# Patient Record
Sex: FEMALE | Race: BLACK OR AFRICAN AMERICAN | NOT HISPANIC OR LATINO | URBAN - METROPOLITAN AREA
[De-identification: names, ages, dates, MRNs, and addresses within clinical notes are randomized per-mention and may not be internally consistent; named-entity substitution may affect disease eponyms.]

---

## 2022-07-19 ENCOUNTER — WEB ENCOUNTER (OUTPATIENT)
Dept: URBAN - METROPOLITAN AREA CLINIC 128 | Facility: CLINIC | Age: 47
End: 2022-07-19

## 2022-07-25 ENCOUNTER — LAB OUTSIDE AN ENCOUNTER (OUTPATIENT)
Dept: URBAN - METROPOLITAN AREA CLINIC 128 | Facility: CLINIC | Age: 47
End: 2022-07-25

## 2022-07-25 ENCOUNTER — OFFICE VISIT (OUTPATIENT)
Dept: URBAN - METROPOLITAN AREA CLINIC 128 | Facility: CLINIC | Age: 47
End: 2022-07-25
Payer: COMMERCIAL

## 2022-07-25 VITALS
DIASTOLIC BLOOD PRESSURE: 88 MMHG | HEART RATE: 87 BPM | SYSTOLIC BLOOD PRESSURE: 134 MMHG | TEMPERATURE: 97.9 F | HEIGHT: 66 IN | BODY MASS INDEX: 27.64 KG/M2 | WEIGHT: 172 LBS

## 2022-07-25 DIAGNOSIS — K76.9 LIVER LESION: ICD-10-CM

## 2022-07-25 DIAGNOSIS — R16.0 HEPATOMEGALY: ICD-10-CM

## 2022-07-25 DIAGNOSIS — R10.11 RUQ ABDOMINAL PAIN: ICD-10-CM

## 2022-07-25 DIAGNOSIS — B27.90 EBV INFECTION: ICD-10-CM

## 2022-07-25 PROCEDURE — 99204 OFFICE O/P NEW MOD 45 MIN: CPT | Performed by: STUDENT IN AN ORGANIZED HEALTH CARE EDUCATION/TRAINING PROGRAM

## 2022-07-25 NOTE — HPI-TODAY'S VISIT:
The pt is a 46 yo F who presents for concerns of hepatomegaly and RUQ pain, colicky, and originally thought to be GB issue.  Workup by her PCP Dr. Melo, which was extensive, included labs, imaging.  Labs showed normal LFTs, T. bili of 0.8, AST/ALT of 12/13, CT showing colon wall thickening with left sided colitis - infection vs. inflammation, hepatomegaly of 20 cm with 2 small liver lesions noted and follow up MRI recommended.  MRI liver protocol redomonstrated hepatomegaly and the lesions were isoenhancing, leading to differentials of FNH vs. hepatic adenoma as  likely with the 2 lesions 8 mm and 4 mm in size.  A 6 month repeat MRI was recommended.  In the interim, a HIDA scan showed 85% EF.  Of note, she is on Apri - an OCP with estrogen in it, which could worsen the liver lesion should it be a HA and not a FNH.  Pt to dicuss with Ob-gyn to change birth control to non-estorgen formulation.  As part of the workup Infectious Mononucleosis titers were obtained and her EBV Ab IGG was high at 515 and viral capsid was high at 271.  She also had covid infection last year but a mild case.  As the pt was also manifesting myalgias and arthralgias, rheumatologic labs were ordered and the pt was seen by a rhematologist.  Has seen a rheumatologist for CK+ and there is concerns for possible scleroderma but no symptoms.  Was placed on prednisone.  Currently she reports her RUQ pain has gotten better.  She also reports no lower GI symptoms.  Has had a normal colonoscopy with Dr. Alex Raymond in Oct 2019 with a recommended 10 year follow up.  Has stopped taking exedrin and not taking any NSAIDs and has noted improvement in her symptoms.  Not taking tylenol either.  Takes meloxicam for inflammation in breasts - dense fibrous breasts, for which she is scheduled for a biopsy.  Taking evening primrose for her myalgias which has given her pain relief in addition to the steroids..    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

## 2022-08-31 ENCOUNTER — OFFICE VISIT (OUTPATIENT)
Dept: URBAN - METROPOLITAN AREA CLINIC 18 | Facility: CLINIC | Age: 47
End: 2022-08-31
Payer: COMMERCIAL

## 2022-08-31 DIAGNOSIS — R16.0 HEPATOMEGALY: ICD-10-CM

## 2022-08-31 PROCEDURE — 76705 ECHO EXAM OF ABDOMEN: CPT | Performed by: STUDENT IN AN ORGANIZED HEALTH CARE EDUCATION/TRAINING PROGRAM

## 2022-09-15 ENCOUNTER — LAB OUTSIDE AN ENCOUNTER (OUTPATIENT)
Dept: URBAN - METROPOLITAN AREA CLINIC 19 | Facility: CLINIC | Age: 47
End: 2022-09-15

## 2022-09-15 ENCOUNTER — TELEPHONE ENCOUNTER (OUTPATIENT)
Dept: URBAN - METROPOLITAN AREA CLINIC 19 | Facility: CLINIC | Age: 47
End: 2022-09-15

## 2022-09-15 ENCOUNTER — OFFICE VISIT (OUTPATIENT)
Dept: URBAN - METROPOLITAN AREA CLINIC 19 | Facility: CLINIC | Age: 47
End: 2022-09-15
Payer: COMMERCIAL

## 2022-09-15 VITALS
HEART RATE: 93 BPM | HEIGHT: 66 IN | BODY MASS INDEX: 27 KG/M2 | TEMPERATURE: 99.1 F | WEIGHT: 168 LBS | SYSTOLIC BLOOD PRESSURE: 118 MMHG | DIASTOLIC BLOOD PRESSURE: 68 MMHG

## 2022-09-15 DIAGNOSIS — B27.90 EBV INFECTION: ICD-10-CM

## 2022-09-15 DIAGNOSIS — B17.9 ACUTE VIRAL HEPATITIS, UNSPECIFIED VIRAL HEPATITIS TYPE: ICD-10-CM

## 2022-09-15 DIAGNOSIS — R16.0 HEPATOMEGALY: ICD-10-CM

## 2022-09-15 DIAGNOSIS — R10.11 RUQ ABDOMINAL PAIN: ICD-10-CM

## 2022-09-15 PROBLEM — 57412004: Status: ACTIVE | Noted: 2022-09-15

## 2022-09-15 PROBLEM — 301717006: Status: ACTIVE | Noted: 2022-07-25

## 2022-09-15 PROCEDURE — 99213 OFFICE O/P EST LOW 20 MIN: CPT | Performed by: STUDENT IN AN ORGANIZED HEALTH CARE EDUCATION/TRAINING PROGRAM

## 2022-09-15 NOTE — HPI-TODAY'S VISIT:
The pt is a 48 yo F who presents for follow up.  Was seen in July as a referral from her PCP for hepatomegaly in the setting of elevated liver tests.  She underwent extensive workup that showed elevated EBV titers but was otherwise negative for the etiology for her presentation.  The pt was also on evening primrose oil supplement at that time as well as Apri - OCP.   She was managed conservatively at that time and asked to repeat labs in Sept prior to follow up.  She was also recommended a repeat us.  She had some concerns of abdominal pain during last clinic visit.  If these had not resolved, the plan was to repeat the colonoscopy.  Her last c-scope was in 2019 with dr. Alex Raymond and she had been recommended a 10-yr follow up.  Today she has occasional twinges superficially in the abdomen but very rare.  Had a normal ultrasound of the liver.   Has switched her Apri to a progesterone only OCP - encasia

## 2022-09-19 ENCOUNTER — OFFICE VISIT (OUTPATIENT)
Dept: URBAN - METROPOLITAN AREA CLINIC 128 | Facility: CLINIC | Age: 47
End: 2022-09-19

## 2022-09-19 PROBLEM — 80515008: Status: ACTIVE | Noted: 2022-07-25

## 2022-09-21 LAB
A/G RATIO: 2.2
ALBUMIN: 4.9
ALKALINE PHOSPHATASE: 87
ALT (SGPT): 39
AST (SGOT): 24
BILIRUBIN, TOTAL: 0.8
BUN/CREATININE RATIO: (no result)
BUN: 15
CALCIUM: 10.2
CARBON DIOXIDE, TOTAL: 24
CHLORIDE: 104
CREATININE: 0.64
EGFR: 108
GLOBULIN, TOTAL: 2.2
GLUCOSE: 83
HDV IGM: NEGATIVE
INR: 0.9
POTASSIUM: 4
PROTEIN, TOTAL: 7.1
PT: 9.8
SODIUM: 139

## 2022-09-22 ENCOUNTER — TELEPHONE ENCOUNTER (OUTPATIENT)
Dept: URBAN - METROPOLITAN AREA CLINIC 19 | Facility: CLINIC | Age: 47
End: 2022-09-22

## 2022-09-22 ENCOUNTER — LAB OUTSIDE AN ENCOUNTER (OUTPATIENT)
Dept: URBAN - METROPOLITAN AREA CLINIC 19 | Facility: CLINIC | Age: 47
End: 2022-09-22

## 2022-09-22 PROBLEM — 240530001: Status: ACTIVE | Noted: 2022-07-25

## 2022-09-22 PROBLEM — 300331000: Status: ACTIVE | Noted: 2022-07-25

## 2024-03-19 ENCOUNTER — OV EP (OUTPATIENT)
Dept: URBAN - METROPOLITAN AREA CLINIC 128 | Facility: CLINIC | Age: 49
End: 2024-03-19
Payer: COMMERCIAL

## 2024-03-19 ENCOUNTER — LAB (OUTPATIENT)
Dept: URBAN - METROPOLITAN AREA CLINIC 128 | Facility: CLINIC | Age: 49
End: 2024-03-19

## 2024-03-19 VITALS
OXYGEN SATURATION: 99 % | SYSTOLIC BLOOD PRESSURE: 140 MMHG | WEIGHT: 180 LBS | HEIGHT: 66 IN | TEMPERATURE: 97.2 F | HEART RATE: 118 BPM | BODY MASS INDEX: 28.93 KG/M2 | DIASTOLIC BLOOD PRESSURE: 90 MMHG

## 2024-03-19 DIAGNOSIS — R16.0 HEPATOMEGALY: ICD-10-CM

## 2024-03-19 DIAGNOSIS — B27.90 EBV INFECTION: ICD-10-CM

## 2024-03-19 DIAGNOSIS — R10.11 RUQ ABDOMINAL PAIN: ICD-10-CM

## 2024-03-19 DIAGNOSIS — B17.9 ACUTE VIRAL HEPATITIS, UNSPECIFIED VIRAL HEPATITIS TYPE: ICD-10-CM

## 2024-03-19 PROCEDURE — 99214 OFFICE O/P EST MOD 30 MIN: CPT | Performed by: STUDENT IN AN ORGANIZED HEALTH CARE EDUCATION/TRAINING PROGRAM

## 2024-03-19 NOTE — HPI-TODAY'S VISIT:
3/19/2024: Radha: The patient is a 48-year-old F with concerns for hepatomegaly and abnormal LFTs in the setting of EBV infection.  Ultrasound at follow-up showed normalization of enlarged liver with no evidence of hepatitis.  LFTs at follow-up normal.  UTD with her screening colonoscopy and due again in 2029 for colon screening. She presents today with the following concerns:  Mirgrating abdominal pain shifting from left to right side.  More "inflamed feeling".  Intermittent pain.  No timing or related to eating.  Has bouts of loose stools or fecal urgency.  Also has joint and bones hurting.    FH:  No FH of IBD.  Father had diabetes and maybe gastroparesis.  ============================  9/15/2022: Radha: The pt is a 48 yo F who presents for follow up.  Was seen in July as a referral from her PCP for hepatomegaly in the setting of elevated liver tests.  She underwent extensive workup that showed elevated EBV titers but was otherwise negative for the etiology for her presentation.  The pt was also on evening primrose oil supplement at that time as well as Apri - OCP.   She was managed conservatively at that time and asked to repeat labs in Sept prior to follow up.  She was also recommended a repeat us.  She had some concerns of abdominal pain during last clinic visit.  If these had not resolved, the plan was to repeat the colonoscopy.  Her last c-scope was in 2019 with dr. Alex Raymond and she had been recommended a 10-yr follow up.  Today she has occasional twinges superficially in the abdomen but very rare.  Had a normal ultrasound of the liver.   Has switched her Apri to a progesterone only OCP - encasia Plan: Repeat LFTs.  1 year follow-up.

## 2024-03-25 LAB
ABSOLUTE BASOPHILS: 23
ABSOLUTE EOSINOPHILS: 92
ABSOLUTE LYMPHOCYTES: 2256
ABSOLUTE MONOCYTES: 608
ABSOLUTE NEUTROPHILS: 4720
ALBUMIN/GLOBULIN RATIO: 1.8
ALBUMIN: 4.8
ALKALINE PHOSPHATASE: 76
ALT: 26
AST: 19
BASOPHILS: 0.3
BILIRUBIN, TOTAL: 0.6
BUN/CREATININE RATIO: (no result)
CALCIUM: 9.5
CARBON DIOXIDE: 26
CHLORIDE: 101
CREATININE: 0.65
EGFR: 107
EOSINOPHILS: 1.2
FIB 4 INDEX: 0.56
FIB 4 INTERPRETATION: (no result)
GLOBULIN: 2.6
GLUCOSE: 98
HEMATOCRIT: 36.1
HEMOGLOBIN: 12.3
IMMUNOGLOBULIN A: 199
INR: 1
INTERPRETATION: (no result)
LYMPHOCYTES: 29.3
MCH: 33.1
MCHC: 34.1
MCV: 97
MONOCYTES: 7.9
MPV: 9.9
NEUTROPHILS: 61.3
PLATELET COUNT: 330
PLATELET COUNT: 330
POTASSIUM: 3.7
PROTEIN, TOTAL: 7.4
PT: 10.3
RDW: 11.3
RED BLOOD CELL COUNT: 3.72
SODIUM: 137
TISSUE TRANSGLUTAMINASE AB, IGA: <1
UREA NITROGEN (BUN): 14
WHITE BLOOD CELL COUNT: 7.7

## 2024-07-02 ENCOUNTER — OFFICE VISIT (OUTPATIENT)
Dept: URBAN - METROPOLITAN AREA CLINIC 128 | Facility: CLINIC | Age: 49
End: 2024-07-02

## 2024-08-13 ENCOUNTER — OFFICE VISIT (OUTPATIENT)
Dept: URBAN - METROPOLITAN AREA CLINIC 128 | Facility: CLINIC | Age: 49
End: 2024-08-13

## 2024-09-03 ENCOUNTER — OFFICE VISIT (OUTPATIENT)
Dept: URBAN - METROPOLITAN AREA CLINIC 128 | Facility: CLINIC | Age: 49
End: 2024-09-03

## 2024-10-15 ENCOUNTER — DASHBOARD ENCOUNTERS (OUTPATIENT)
Age: 49
End: 2024-10-15

## 2024-10-15 ENCOUNTER — OFFICE VISIT (OUTPATIENT)
Dept: URBAN - METROPOLITAN AREA CLINIC 128 | Facility: CLINIC | Age: 49
End: 2024-10-15
Payer: COMMERCIAL

## 2024-10-15 VITALS
HEIGHT: 66 IN | SYSTOLIC BLOOD PRESSURE: 140 MMHG | WEIGHT: 184.2 LBS | TEMPERATURE: 97.5 F | OXYGEN SATURATION: 98 % | BODY MASS INDEX: 29.6 KG/M2 | HEART RATE: 100 BPM | DIASTOLIC BLOOD PRESSURE: 84 MMHG

## 2024-10-15 DIAGNOSIS — B17.9 ACUTE VIRAL HEPATITIS, UNSPECIFIED VIRAL HEPATITIS TYPE: ICD-10-CM

## 2024-10-15 DIAGNOSIS — R10.11 RUQ ABDOMINAL PAIN: ICD-10-CM

## 2024-10-15 DIAGNOSIS — K64.8 INTERNAL HEMORRHOIDS: ICD-10-CM

## 2024-10-15 DIAGNOSIS — B27.90 EBV INFECTION: ICD-10-CM

## 2024-10-15 DIAGNOSIS — R16.0 HEPATOMEGALY: ICD-10-CM

## 2024-10-15 PROBLEM — 90458007: Status: ACTIVE | Noted: 2024-10-15

## 2024-10-15 PROCEDURE — 99213 OFFICE O/P EST LOW 20 MIN: CPT | Performed by: STUDENT IN AN ORGANIZED HEALTH CARE EDUCATION/TRAINING PROGRAM

## 2024-10-15 RX ORDER — HYDROCORTISONE 25 MG/G
1 APPLICATION CREAM TOPICAL THREE TIMES A DAY
Qty: 60 GRAM | Refills: 1 | OUTPATIENT
Start: 2024-10-15 | End: 2024-10-25

## 2024-10-15 NOTE — HPI-TODAY'S VISIT:
10/15/2024:  Radha: 48 yo F seen in follow up.  Previously seen for hepatomegaly and liver lesion on MRI in June 2022 not seen on US in March 2024.  Migratory abdominal pain with bouts of fecal urgency or loose stools.  Recommended FODMAP diet for IBS symptoms.  If no improvement, recommended to f/u for evaluation for EGD/colonoscopy.  Today she reports the following concerns:  Has found some of her triggers.  Chipotle, guacamole, raw onions, dairy.  Sometimes symptoms come on randomly.  Due for routine colonoscopy in 2029.  Wants to hold off on colonoscopy now.

## 2024-10-15 NOTE — HPI-OTHER HISTORIES
March 2024:US did not show hepatic lesions noted on prior MRI.  Recommended repeat MRI.  renal cyst.  No acute process.  3/19/2024: Radha: The patient is a 48-year-old F with concerns for hepatomegaly and abnormal LFTs in the setting of EBV infection.  Ultrasound at follow-up showed normalization of enlarged liver with no evidence of hepatitis.  LFTs at follow-up normal.  UTD with her screening colonoscopy and due again in 2029 for colon screening. She presents today with the following concerns:  Mirgrating abdominal pain shifting from left to right side.  More "inflamed feeling".  Intermittent pain.  No timing or related to eating.  Has bouts of loose stools or fecal urgency.  Also has joint and bones hurting.    FH:  No FH of IBD.  Father had diabetes and maybe gastroparesis Plan:  Labs to include cbc, cmp, pt/inr, celiac test, US of abdomen, FODMAP diet.  If no benefit from FODMAP diet consider egd/colonoscopy.  ============================  9/15/2022: Radha: The pt is a 46 yo F who presents for follow up. Was seen in July as a referral from her PCP for hepatomegaly in the setting of elevated liver tests. She underwent extensive workup that showed elevated EBV titers but was otherwise negative for the etiology for her presentation. The pt was also on evening primrose oil supplement at that time as well as Apri - OCP.  She was managed conservatively at that time and asked to repeat labs in Sept prior to follow up. She was also recommended a repeat us.  She had some concerns of abdominal pain during last clinic visit. If these had not resolved, the plan was to repeat the colonoscopy. Her last c-scope was in 2019 with dr. Alex Raymond and she had been recommended a 10-yr follow up.  Today she has occasional twinges superficially in the abdomen but very rare. Had a normal ultrasound of the liver. Has switched her Apri to a progesterone only OCP - encasia Plan: Repeat LFTs.  1 year follow-up.

## 2025-03-04 ENCOUNTER — OFFICE VISIT (OUTPATIENT)
Dept: URBAN - METROPOLITAN AREA CLINIC 128 | Facility: CLINIC | Age: 50
End: 2025-03-04
Payer: COMMERCIAL

## 2025-03-04 ENCOUNTER — LAB OUTSIDE AN ENCOUNTER (OUTPATIENT)
Dept: URBAN - METROPOLITAN AREA CLINIC 128 | Facility: CLINIC | Age: 50
End: 2025-03-04

## 2025-03-04 VITALS
SYSTOLIC BLOOD PRESSURE: 144 MMHG | HEART RATE: 106 BPM | WEIGHT: 182.4 LBS | OXYGEN SATURATION: 98 % | BODY MASS INDEX: 29.32 KG/M2 | HEIGHT: 66 IN | DIASTOLIC BLOOD PRESSURE: 96 MMHG | TEMPERATURE: 97.7 F

## 2025-03-04 DIAGNOSIS — R10.11 RUQ ABDOMINAL PAIN: ICD-10-CM

## 2025-03-04 DIAGNOSIS — K64.8 INTERNAL HEMORRHOIDS: ICD-10-CM

## 2025-03-04 DIAGNOSIS — B27.90 EBV INFECTION: ICD-10-CM

## 2025-03-04 DIAGNOSIS — B17.9 ACUTE VIRAL HEPATITIS, UNSPECIFIED VIRAL HEPATITIS TYPE: ICD-10-CM

## 2025-03-04 DIAGNOSIS — R19.5 POSITIVE COLORECTAL CANCER SCREENING USING COLOGUARD TEST: ICD-10-CM

## 2025-03-04 DIAGNOSIS — R16.0 HEPATOMEGALY: ICD-10-CM

## 2025-03-04 PROCEDURE — 99214 OFFICE O/P EST MOD 30 MIN: CPT | Performed by: STUDENT IN AN ORGANIZED HEALTH CARE EDUCATION/TRAINING PROGRAM

## 2025-03-04 PROCEDURE — 99204 OFFICE O/P NEW MOD 45 MIN: CPT | Performed by: STUDENT IN AN ORGANIZED HEALTH CARE EDUCATION/TRAINING PROGRAM

## 2025-03-04 RX ORDER — DULOXETINE 30 MG/1
1 CAPSULE CAPSULE, DELAYED RELEASE PELLETS ORAL ONCE A DAY
Status: ACTIVE | COMMUNITY

## 2025-03-04 RX ORDER — ETONOGESTREL AND ETHINYL ESTRADIOL .12; .015 MG/D; MG/D
1 RING LEAVE IN PLACE FOR 3 WEEKS, REMOVE, AND REPLACE WITH A NEW RING AFTER 7 DAY BREAK INSERT, EXTENDED RELEASE VAGINAL
Status: ACTIVE | COMMUNITY

## 2025-03-04 RX ORDER — SODIUM, POTASSIUM,MAG SULFATES 17.5-3.13G
177 ML SOLUTION, RECONSTITUTED, ORAL ORAL
Refills: 0 | OUTPATIENT
Start: 2025-03-04 | End: 2025-03-06

## 2025-03-04 RX ORDER — SEMAGLUTIDE 0.5 MG/.5ML
0.5 ML INJECTION, SOLUTION SUBCUTANEOUS
Status: ACTIVE | COMMUNITY

## 2025-03-04 RX ORDER — OXYBUTYNIN CHLORIDE 5 MG/1
1 TABLET TABLET ORAL ONCE A DAY
Status: ACTIVE | COMMUNITY

## 2025-03-04 NOTE — HPI-OTHER HISTORIES
10/15/2024:  Radha: 48 yo F seen in follow up.  Previously seen for hepatomegaly and liver lesion on MRI in June 2022 not seen on US in March 2024.  Migratory abdominal pain with bouts of fecal urgency or loose stools.  Recommended FODMAP diet for IBS symptoms.  If no improvement, recommended to f/u for evaluation for EGD/colonoscopy.  Today she reports the following concerns:  Has found some of her triggers.  Chipotle, guacamole, raw onions, dairy.  Sometimes symptoms come on randomly. Plan: FODMAP diet.  If no relief, recommend RTC for evaluation of EGD/colonoscopy.  Anusol for hemorrhoids. Due for routine colonoscopy in 2029.  Wants to hold off on colonoscopy now.  ===================  March 2024:US did not show hepatic lesions noted on prior MRI.  Recommended repeat MRI.  renal cyst.  No acute process.  3/19/2024: Radha: The patient is a 48-year-old F with concerns for hepatomegaly and abnormal LFTs in the setting of EBV infection.  Ultrasound at follow-up showed normalization of enlarged liver with no evidence of hepatitis.  LFTs at follow-up normal.  UTD with her screening colonoscopy and due again in 2029 for colon screening. She presents today with the following concerns:  Mirgrating abdominal pain shifting from left to right side.  More "inflamed feeling".  Intermittent pain.  No timing or related to eating.  Has bouts of loose stools or fecal urgency.  Also has joint and bones hurting.    FH:  No FH of IBD.  Father had diabetes and maybe gastroparesis Plan:  Labs to include cbc, cmp, pt/inr, celiac test, US of abdomen, FODMAP diet.  If no benefit from FODMAP diet consider egd/colonoscopy.  ============================  9/15/2022: Radha: The pt is a 46 yo F who presents for follow up. Was seen in July as a referral from her PCP for hepatomegaly in the setting of elevated liver tests. She underwent extensive workup that showed elevated EBV titers but was otherwise negative for the etiology for her presentation. The pt was also on evening primrose oil supplement at that time as well as Apri - OCP.  She was managed conservatively at that time and asked to repeat labs in Sept prior to follow up. She was also recommended a repeat us.  She had some concerns of abdominal pain during last clinic visit. If these had not resolved, the plan was to repeat the colonoscopy. Her last c-scope was in 2019 with dr. Alex Raymond and she had been recommended a 10-yr follow up.  Today she has occasional twinges superficially in the abdomen but very rare. Had a normal ultrasound of the liver. Has switched her Apri to a progesterone only OCP - encasia Plan: Repeat LFTs.  1 year follow-up.

## 2025-03-04 NOTE — HPI-TODAY'S VISIT:
3/4/2025:  ilana: 49-year-old previously seen for hepatomegaly and liver lesion on MRI that was not seen on follow-up ultrasound in March, thought to be from an EBV infection versus OCP, with migratory abdominal pain and fecal urgency which had improved with initiation of FODMAP diet returns to clinic.  Plan is to repeat MRI and LFTs in April 2025.  She was due for routine colonoscopy in 2029 based on her last colonoscopy. She took the cologuard test and it came back positive.  Unfortunately she has internal hemorrhoids.

## 2025-03-14 ENCOUNTER — WEB ENCOUNTER (OUTPATIENT)
Dept: URBAN - METROPOLITAN AREA CLINIC 128 | Facility: CLINIC | Age: 50
End: 2025-03-14

## 2025-03-14 RX ORDER — SODIUM, POTASSIUM,MAG SULFATES 17.5-3.13G
177 ML SOLUTION, RECONSTITUTED, ORAL ORAL
Refills: 0
Start: 2025-03-04 | End: 2025-03-19

## 2025-03-17 ENCOUNTER — TELEPHONE ENCOUNTER (OUTPATIENT)
Dept: URBAN - METROPOLITAN AREA CLINIC 19 | Facility: CLINIC | Age: 50
End: 2025-03-17

## 2025-04-18 ENCOUNTER — CLAIMS CREATED FROM THE CLAIM WINDOW (OUTPATIENT)
Dept: URBAN - METROPOLITAN AREA SURGERY CENTER 31 | Facility: SURGERY CENTER | Age: 50
End: 2025-04-18
Payer: COMMERCIAL

## 2025-04-18 DIAGNOSIS — R19.5 ABNORMAL CONSISTENCY OF STOOL: ICD-10-CM

## 2025-04-18 DIAGNOSIS — R19.5 POSITIVE COLORECTAL CANCER SCREENING USING COLOGUARD TEST: ICD-10-CM

## 2025-04-18 DIAGNOSIS — Z12.11 COLON CANCER SCREENING: ICD-10-CM

## 2025-04-18 DIAGNOSIS — Z12.11 ENCOUNTER FOR SCREENING FOR MALIGNANT NEOPLASM OF COLON: ICD-10-CM

## 2025-04-18 PROCEDURE — 45378 DIAGNOSTIC COLONOSCOPY: CPT | Performed by: STUDENT IN AN ORGANIZED HEALTH CARE EDUCATION/TRAINING PROGRAM

## 2025-04-18 PROCEDURE — 00812 ANES LWR INTST SCR COLSC: CPT | Performed by: NURSE ANESTHETIST, CERTIFIED REGISTERED

## 2025-04-18 RX ORDER — OXYBUTYNIN CHLORIDE 5 MG/1
1 TABLET TABLET ORAL ONCE A DAY
Status: ACTIVE | COMMUNITY

## 2025-04-18 RX ORDER — DULOXETINE 30 MG/1
1 CAPSULE CAPSULE, DELAYED RELEASE PELLETS ORAL ONCE A DAY
Status: ACTIVE | COMMUNITY

## 2025-04-18 RX ORDER — SEMAGLUTIDE 0.5 MG/.5ML
0.5 ML INJECTION, SOLUTION SUBCUTANEOUS
Status: ACTIVE | COMMUNITY

## 2025-04-18 RX ORDER — ETONOGESTREL AND ETHINYL ESTRADIOL .12; .015 MG/D; MG/D
1 RING LEAVE IN PLACE FOR 3 WEEKS, REMOVE, AND REPLACE WITH A NEW RING AFTER 7 DAY BREAK INSERT, EXTENDED RELEASE VAGINAL
Status: ACTIVE | COMMUNITY